# Patient Record
Sex: FEMALE | Race: WHITE | NOT HISPANIC OR LATINO | Employment: FULL TIME | URBAN - METROPOLITAN AREA
[De-identification: names, ages, dates, MRNs, and addresses within clinical notes are randomized per-mention and may not be internally consistent; named-entity substitution may affect disease eponyms.]

---

## 2024-05-06 ENCOUNTER — OFFICE VISIT (OUTPATIENT)
Dept: OBGYN CLINIC | Facility: CLINIC | Age: 39
End: 2024-05-06
Payer: COMMERCIAL

## 2024-05-06 VITALS
DIASTOLIC BLOOD PRESSURE: 72 MMHG | SYSTOLIC BLOOD PRESSURE: 106 MMHG | HEIGHT: 68 IN | WEIGHT: 169.2 LBS | BODY MASS INDEX: 25.64 KG/M2

## 2024-05-06 DIAGNOSIS — Z12.4 CERVICAL CANCER SCREENING: ICD-10-CM

## 2024-05-06 DIAGNOSIS — Z01.419 ENCOUNTER FOR GYNECOLOGICAL EXAMINATION: ICD-10-CM

## 2024-05-06 DIAGNOSIS — N97.8 FEMALE INFERTILITY DUE TO ADVANCED MATERNAL AGE: ICD-10-CM

## 2024-05-06 DIAGNOSIS — Z01.419 ENCOUNTER FOR ROUTINE GYNECOLOGICAL EXAMINATION WITH PAPANICOLAOU SMEAR OF CERVIX: Primary | ICD-10-CM

## 2024-05-06 PROCEDURE — G0145 SCR C/V CYTO,THINLAYER,RESCR: HCPCS | Performed by: OBSTETRICS & GYNECOLOGY

## 2024-05-06 PROCEDURE — 99385 PREV VISIT NEW AGE 18-39: CPT | Performed by: OBSTETRICS & GYNECOLOGY

## 2024-05-06 PROCEDURE — G0476 HPV COMBO ASSAY CA SCREEN: HCPCS | Performed by: OBSTETRICS & GYNECOLOGY

## 2024-05-06 RX ORDER — RIBOFLAVIN (VITAMIN B2) 100 MG
100 TABLET ORAL DAILY
COMMUNITY

## 2024-05-06 NOTE — ASSESSMENT & PLAN NOTE
Pap/HPV collected    Encourage healthy diet, exercise, Calcium 1200mg per day and at least 800 iu Vitamin D daily.

## 2024-05-06 NOTE — PROGRESS NOTES
Assessment/Plan:    Encounter for gynecological examination  Pap/HPV collected    Encourage healthy diet, exercise, Calcium 1200mg per day and at least 800 iu Vitamin D daily.       Diagnoses and all orders for this visit:    Encounter for routine gynecological examination with Papanicolaou smear of cervix  -     Liquid-based pap, screening    Cervical cancer screening  -     Liquid-based pap, screening    Encounter for gynecological examination    Female infertility due to advanced maternal age  -     Ambulatory referral to Infertility; Future    Other orders  -     Triprolidine-Pseudoephedrine (ANTIHISTAMINE PO); Take by mouth if needed  -     Prenatal Vit-Fe Fumarate-FA (PRENATAL/FOLIC ACID PO); Take by mouth  -     Omega-3 Fatty Acids (FISH OIL OMEGA-3 PO); Take by mouth  -     Ascorbic Acid (vitamin C) 100 MG tablet; Take 100 mg by mouth daily          Subjective:      Patient ID: Aliza Dockery is a 39 y.o. female.    Patient presents for a routine annual visit  Menarche-14 Y/O  Last Pap Smear- no record. Collect today  LMP-4/10/24  Birth control-none    Non smoker  Social drinker  Currently sexually active  No family history of uterine, ovarian, cervical or breast cancer    Periods are regular, 3 days long. Painful first day. Trying to conceive. Bought ovulation tests to help track.   Advanced maternal age  Menses regular - two instances of late periods  Splits time at home/city - may impact ability to have sex on fertile days  Second marriage, did not try for pregnancy with first.     Recommend referral to [facility] sooner rather than later due to age.   Have been trying for few months.       Gynecologic Exam  She reports no genital itching, genital lesions, genital odor, genital rash, pelvic pain, vaginal bleeding or vaginal discharge. The patient is experiencing no pain. Pertinent negatives include no chills, constipation, diarrhea, fever, nausea, sore throat or vomiting. She is sexually active. The patient's  menstrual history has been regular.       The following portions of the patient's history were reviewed and updated as appropriate: She  has a past medical history of Depression and Varicella (1994).  She   Patient Active Problem List    Diagnosis Date Noted    Encounter for gynecological examination 05/06/2024     She  has a past surgical history that includes Breast biopsy (2006).  Her family history includes Diabetes in her paternal grandmother; Migraines in her mother; Rashes / Skin problems in her brother.  She  reports that she has never smoked. She has never used smokeless tobacco. She reports current alcohol use of about 3.0 standard drinks of alcohol per week. She reports that she does not use drugs.  Current Outpatient Medications   Medication Sig Dispense Refill    Ascorbic Acid (vitamin C) 100 MG tablet Take 100 mg by mouth daily      Omega-3 Fatty Acids (FISH OIL OMEGA-3 PO) Take by mouth      Prenatal Vit-Fe Fumarate-FA (PRENATAL/FOLIC ACID PO) Take by mouth      Triprolidine-Pseudoephedrine (ANTIHISTAMINE PO) Take by mouth if needed       No current facility-administered medications for this visit.     Current Outpatient Medications on File Prior to Visit   Medication Sig    Ascorbic Acid (vitamin C) 100 MG tablet Take 100 mg by mouth daily    Omega-3 Fatty Acids (FISH OIL OMEGA-3 PO) Take by mouth    Prenatal Vit-Fe Fumarate-FA (PRENATAL/FOLIC ACID PO) Take by mouth    Triprolidine-Pseudoephedrine (ANTIHISTAMINE PO) Take by mouth if needed     No current facility-administered medications on file prior to visit.     She is allergic to penicillins..    Review of Systems   Constitutional:  Negative for activity change, appetite change, chills, fatigue and fever.   HENT:  Negative for rhinorrhea, sneezing and sore throat.    Eyes:  Negative for visual disturbance.   Respiratory:  Negative for cough, shortness of breath and wheezing.    Cardiovascular:  Negative for chest pain, palpitations and leg  "swelling.   Gastrointestinal:  Negative for abdominal distention, constipation, diarrhea, nausea and vomiting.   Genitourinary:  Negative for difficulty urinating, pelvic pain and vaginal discharge.   Neurological:  Negative for syncope and light-headedness.         Objective:      /72 (BP Location: Left arm, Patient Position: Sitting, Cuff Size: Standard)   Ht 5' 8\" (1.727 m)   Wt 76.7 kg (169 lb 3.2 oz)   LMP 04/10/2024   BMI 25.73 kg/m²          Physical Exam  Chest:   Breasts:     Breasts are symmetrical.      Right: No inverted nipple, mass, nipple discharge, skin change or tenderness.      Left: No inverted nipple, mass, nipple discharge, skin change or tenderness.   Genitourinary:     Labia:         Right: No rash, tenderness, lesion or injury.         Left: No rash, tenderness, lesion or injury.       Vagina: Normal. No vaginal discharge, erythema, tenderness or bleeding.      Cervix: No cervical motion tenderness, discharge, friability, erythema or cervical bleeding.      Uterus: Not deviated, not enlarged, not fixed and not tender.       Adnexa:         Right: No mass, tenderness or fullness.          Left: No mass, tenderness or fullness.     Neurological:      Mental Status: She is alert and oriented to person, place, and time.           "

## 2024-05-07 LAB
HPV HR 12 DNA CVX QL NAA+PROBE: NEGATIVE
HPV16 DNA CVX QL NAA+PROBE: NEGATIVE
HPV18 DNA CVX QL NAA+PROBE: NEGATIVE

## 2024-05-14 LAB
LAB AP GYN PRIMARY INTERPRETATION: NORMAL
Lab: NORMAL

## 2024-05-15 ENCOUNTER — TELEPHONE (OUTPATIENT)
Dept: OBGYN CLINIC | Facility: CLINIC | Age: 39
End: 2024-05-15

## 2024-05-15 NOTE — TELEPHONE ENCOUNTER
----- Message from Patria Nye MD sent at 5/14/2024  3:24 PM EDT -----  Please call Patient pap and HPV negative